# Patient Record
Sex: MALE | Race: WHITE | NOT HISPANIC OR LATINO | Employment: PART TIME | URBAN - NONMETROPOLITAN AREA
[De-identification: names, ages, dates, MRNs, and addresses within clinical notes are randomized per-mention and may not be internally consistent; named-entity substitution may affect disease eponyms.]

---
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
## 2025-07-23 NOTE — PROGRESS NOTES
GENERAL SURGERY OFFICE NEW PATIENT HISTORY AND PHYSICAL    Referring Provider: Douglas Carpenter MD  Primary Care Provider: Douglas Carpenter MD    Chief Complaint   Patient presents with    Hernia       Subjective .     History of present illness:  Grayson Menjivar is a 20 y.o. male who presents for evaluation for right groin and right testicular pain with suspicion of a right inguinal hernia.  He reports that he had noticed gradual onset right groin pain that is radiating down his right testicle.  The pain comes on spontaneously and intermittently with self resolution, but also seems to be triggered by strenuous activity and straining.  He works at [employer] where he performs [employer].  He also plays golf quite a bit, but the groin pain is not exacerbated during this time.  He has noticed a small bulge at the site.  He denies any urinary changes.      History:  History reviewed. No pertinent past medical history., History reviewed. No pertinent surgical history., History reviewed. No pertinent family history.,   Social History     Tobacco Use    Smoking status: Never    Smokeless tobacco: Never   Vaping Use    Vaping status: Never Used   Substance Use Topics    Alcohol use: Yes     Comment: daily 5 cans of beer daily    Drug use: Never       Current Outpatient Medications:     cyclobenzaprine (FLEXERIL) 5 MG tablet, Take 1 tablet by mouth Every 8 (Eight) Hours As Needed for Muscle Spasms for up to 10 days., Disp: 30 tablet, Rfl: 0    gabapentin (NEURONTIN) 100 MG capsule, Take 1 capsule by mouth 3 (Three) Times a Day As Needed (Groin pain) for up to 10 days. Start at nighttime and titrate as needed, Disp: 30 capsule, Rfl: 0    Allergies:  Patient has no known allergies.      The following portions of the patient's history were reviewed and updated as appropriate: allergies, current medications, past family history, past medical history, past social history, past surgical history, and  "problem list.      Review of Systems  A comprehensive 14 point review of systems was performed and is negative unless otherwise noted      Objective   BP 96/62   Ht 193 cm (76\")   Wt 78.7 kg (173 lb 9.6 oz)   BMI 21.13 kg/m²     BMI followup discussion/instruction with patient: none (medical contraindication)      Physical Exam  Constitutional:       Appearance: Normal appearance. He is normal weight.      Comments: Adult male, in no acute distress. Normal development, normal body habitus. Well nourished   HENT:      Head: Normocephalic and atraumatic.      Right Ear: External ear normal.      Left Ear: External ear normal.      Ears:      Comments: Hearing intact     Nose: Nose normal.      Comments: Nares patent, no septal deviation, no drainage     Mouth/Throat:      Comments: Airway patent, dentition intact, mucus membranes moist  Eyes:      Extraocular Movements: Extraocular movements intact.      Conjunctiva/sclera: Conjunctivae normal.      Pupils: Pupils are equal, round, and reactive to light.      Comments: External eyelids grossly normal, vision intact, no scleral icterus   Neck:      Comments: Trachea midline  Cardiovascular:      Rate and Rhythm: Normal rate.      Comments: Normotensive, no JVD bilaterally  Pulmonary:      Effort: Pulmonary effort is normal.      Breath sounds: Normal breath sounds.      Comments: Normal respiratory rate  Abdominal:      General: Abdomen is flat.      Palpations: Abdomen is soft.      Comments: Non tender. No scars, hernias or masses.   Genitourinary:     Penis: Normal.       Testes: Normal.      Comments: Testicles descended bilaterally.  No evidence of a left inguinal hernia.  There is a small bulge with Valsalva at the spermatic cord consistent with a small indirect inguinal hernia.  No tenderness to palpation at the site of the bulging inguinal hernia.  Musculoskeletal:         General: Normal range of motion.      Cervical back: Normal range of motion.   Skin:   "   General: Skin is warm and dry.      Comments: Skin color is consistent with ethnicity   Neurological:      General: No focal deficit present.      Mental Status: He is alert and oriented to person, place, and time.   Psychiatric:         Mood and Affect: Mood normal.         Behavior: Behavior normal.         Thought Content: Thought content normal.         Judgment: Judgment normal.      Comments: Patient understands disease process and has decision making capacity.              Results:  Labs:  I personally reviewed all pertinent labs.   Imaging: I personally reviewed all pertinent imaging studies.   Pathology:        Assessment & Plan   Diagnoses and all orders for this visit:    1. Right inguinal hernia (Primary)  -     gabapentin (NEURONTIN) 100 MG capsule; Take 1 capsule by mouth 3 (Three) Times a Day As Needed (Groin pain) for up to 10 days. Start at nighttime and titrate as needed  Dispense: 30 capsule; Refill: 0    2. Right groin pain  -     gabapentin (NEURONTIN) 100 MG capsule; Take 1 capsule by mouth 3 (Three) Times a Day As Needed (Groin pain) for up to 10 days. Start at nighttime and titrate as needed  Dispense: 30 capsule; Refill: 0    3. Right testicular pain  -     gabapentin (NEURONTIN) 100 MG capsule; Take 1 capsule by mouth 3 (Three) Times a Day As Needed (Groin pain) for up to 10 days. Start at nighttime and titrate as needed  Dispense: 30 capsule; Refill: 0    Other orders  -     cyclobenzaprine (FLEXERIL) 5 MG tablet; Take 1 tablet by mouth Every 8 (Eight) Hours As Needed for Muscle Spasms for up to 10 days.  Dispense: 30 tablet; Refill: 0    20-year-old active male with right groin pain and right testicular pain in the setting of a small indirect right inguinal hernia.  Given that he has intermittent pain that is not always reproducible with a small inguinal hernia, I am hesitant to offer him immediate surgery.  If this is a musculoskeletal injury, it should improve with time and light  activity.  I have recommended trialing ibuprofen, Tylenol and will prescribe him Flexeril and gabapentin.  If he does not improve over the course of the next 4 weeks or so, and he continues to have testicular pain, I will obtain a right groin ultrasound and a testicular ultrasound to ensure that he does not have any testicular pathology contributing to his symptoms such as a varicocele or hydrocele.  If he continues to have pain and the ultrasound does not show any testicular abnormalities and confirms the right groin hernia, and we will discuss inguinal hernia repair.  Follow-up in 4 weeks.  Sooner if needed.         Thank you for the referral and trusting me with the care of your patient.    Grayson Saravia MD, FACS  General Surgery  7/24/2025  09:40 CDT    Please note that portions of this note were completed with a voice recognition program.

## 2025-07-24 ENCOUNTER — OFFICE VISIT (OUTPATIENT)
Dept: SURGERY | Facility: CLINIC | Age: 20
End: 2025-07-24
Payer: COMMERCIAL

## 2025-07-24 VITALS
WEIGHT: 173.6 LBS | DIASTOLIC BLOOD PRESSURE: 62 MMHG | SYSTOLIC BLOOD PRESSURE: 96 MMHG | BODY MASS INDEX: 21.14 KG/M2 | HEIGHT: 76 IN

## 2025-07-24 DIAGNOSIS — R10.31 RIGHT GROIN PAIN: ICD-10-CM

## 2025-07-24 DIAGNOSIS — K40.90 RIGHT INGUINAL HERNIA: Primary | ICD-10-CM

## 2025-07-24 DIAGNOSIS — N50.811 RIGHT TESTICULAR PAIN: ICD-10-CM

## 2025-07-24 RX ORDER — GABAPENTIN 100 MG/1
100 CAPSULE ORAL 3 TIMES DAILY PRN
Qty: 30 CAPSULE | Refills: 0 | Status: SHIPPED | OUTPATIENT
Start: 2025-07-24 | End: 2025-08-03

## 2025-07-24 RX ORDER — CYCLOBENZAPRINE HCL 5 MG
5 TABLET ORAL EVERY 8 HOURS PRN
Qty: 30 TABLET | Refills: 0 | Status: SHIPPED | OUTPATIENT
Start: 2025-07-24 | End: 2025-08-03

## 2025-08-26 ENCOUNTER — OFFICE VISIT (OUTPATIENT)
Dept: SURGERY | Facility: CLINIC | Age: 20
End: 2025-08-26
Payer: COMMERCIAL

## 2025-08-26 VITALS
DIASTOLIC BLOOD PRESSURE: 84 MMHG | BODY MASS INDEX: 21.07 KG/M2 | SYSTOLIC BLOOD PRESSURE: 134 MMHG | HEIGHT: 76 IN | WEIGHT: 173 LBS

## 2025-08-26 DIAGNOSIS — N50.811 RIGHT TESTICULAR PAIN: ICD-10-CM

## 2025-08-26 DIAGNOSIS — R10.31 RIGHT GROIN PAIN: Primary | ICD-10-CM
